# Patient Record
Sex: FEMALE | URBAN - METROPOLITAN AREA
[De-identification: names, ages, dates, MRNs, and addresses within clinical notes are randomized per-mention and may not be internally consistent; named-entity substitution may affect disease eponyms.]

---

## 2019-11-21 ENCOUNTER — OFFICE VISIT (OUTPATIENT)
Dept: PHYSICAL MEDICINE AND REHAB | Age: 69
End: 2019-11-21
Payer: MEDICARE

## 2019-11-21 VITALS — BODY MASS INDEX: 21.97 KG/M2 | WEIGHT: 124 LBS | HEIGHT: 63 IN

## 2019-11-21 DIAGNOSIS — R20.2 NUMBNESS AND TINGLING: ICD-10-CM

## 2019-11-21 DIAGNOSIS — M79.642 LEFT HAND PAIN: Primary | ICD-10-CM

## 2019-11-21 DIAGNOSIS — R20.0 NUMBNESS AND TINGLING: ICD-10-CM

## 2019-11-21 PROCEDURE — 1123F ACP DISCUSS/DSCN MKR DOCD: CPT | Performed by: PHYSICAL MEDICINE & REHABILITATION

## 2019-11-21 PROCEDURE — G8400 PT W/DXA NO RESULTS DOC: HCPCS | Performed by: PHYSICAL MEDICINE & REHABILITATION

## 2019-11-21 PROCEDURE — G8427 DOCREV CUR MEDS BY ELIG CLIN: HCPCS | Performed by: PHYSICAL MEDICINE & REHABILITATION

## 2019-11-21 PROCEDURE — G8484 FLU IMMUNIZE NO ADMIN: HCPCS | Performed by: PHYSICAL MEDICINE & REHABILITATION

## 2019-11-21 PROCEDURE — 95886 MUSC TEST DONE W/N TEST COMP: CPT | Performed by: PHYSICAL MEDICINE & REHABILITATION

## 2019-11-21 PROCEDURE — 4040F PNEUMOC VAC/ADMIN/RCVD: CPT | Performed by: PHYSICAL MEDICINE & REHABILITATION

## 2019-11-21 PROCEDURE — 1090F PRES/ABSN URINE INCON ASSESS: CPT | Performed by: PHYSICAL MEDICINE & REHABILITATION

## 2019-11-21 PROCEDURE — 1036F TOBACCO NON-USER: CPT | Performed by: PHYSICAL MEDICINE & REHABILITATION

## 2019-11-21 PROCEDURE — G8420 CALC BMI NORM PARAMETERS: HCPCS | Performed by: PHYSICAL MEDICINE & REHABILITATION

## 2019-11-21 PROCEDURE — 99202 OFFICE O/P NEW SF 15 MIN: CPT | Performed by: PHYSICAL MEDICINE & REHABILITATION

## 2019-11-21 PROCEDURE — 95913 NRV CNDJ TEST 13/> STUDIES: CPT | Performed by: PHYSICAL MEDICINE & REHABILITATION

## 2019-11-21 PROCEDURE — 3017F COLORECTAL CA SCREEN DOC REV: CPT | Performed by: PHYSICAL MEDICINE & REHABILITATION

## 2019-12-27 DIAGNOSIS — M79.642 LEFT HAND PAIN: ICD-10-CM

## 2022-03-09 LAB
ALBUMIN SERPL-MCNC: 4.5 G/DL (ref 3.5–5.2)
ALP BLD-CCNC: 126 U/L (ref 35–104)
ALT SERPL-CCNC: 13 U/L (ref 0–32)
ANION GAP SERPL CALCULATED.3IONS-SCNC: 15 MMOL/L (ref 7–16)
AST SERPL-CCNC: 20 U/L (ref 0–31)
BASOPHILS ABSOLUTE: 0.03 E9/L (ref 0–0.2)
BASOPHILS RELATIVE PERCENT: 0.4 % (ref 0–2)
BILIRUB SERPL-MCNC: 0.3 MG/DL (ref 0–1.2)
BUN BLDV-MCNC: 15 MG/DL (ref 6–23)
CALCIUM SERPL-MCNC: 9.8 MG/DL (ref 8.6–10.2)
CHLORIDE BLD-SCNC: 104 MMOL/L (ref 98–107)
CHOLESTEROL, TOTAL: 201 MG/DL (ref 0–199)
CO2: 24 MMOL/L (ref 22–29)
CREAT SERPL-MCNC: 0.8 MG/DL (ref 0.5–1)
EOSINOPHILS ABSOLUTE: 0.21 E9/L (ref 0.05–0.5)
EOSINOPHILS RELATIVE PERCENT: 3.1 % (ref 0–6)
GFR AFRICAN AMERICAN: >60
GFR NON-AFRICAN AMERICAN: >60 ML/MIN/1.73
GLUCOSE BLD-MCNC: 111 MG/DL (ref 74–99)
HCT VFR BLD CALC: 37.1 % (ref 34–48)
HDLC SERPL-MCNC: 75 MG/DL
HEMOGLOBIN: 11.3 G/DL (ref 11.5–15.5)
IMMATURE GRANULOCYTES #: 0.01 E9/L
IMMATURE GRANULOCYTES %: 0.1 % (ref 0–5)
LDL CHOLESTEROL CALCULATED: 103 MG/DL (ref 0–99)
LYMPHOCYTES ABSOLUTE: 2.23 E9/L (ref 1.5–4)
LYMPHOCYTES RELATIVE PERCENT: 33.4 % (ref 20–42)
MCH RBC QN AUTO: 27.9 PG (ref 26–35)
MCHC RBC AUTO-ENTMCNC: 30.5 % (ref 32–34.5)
MCV RBC AUTO: 91.6 FL (ref 80–99.9)
MONOCYTES ABSOLUTE: 0.45 E9/L (ref 0.1–0.95)
MONOCYTES RELATIVE PERCENT: 6.7 % (ref 2–12)
NEUTROPHILS ABSOLUTE: 3.75 E9/L (ref 1.8–7.3)
NEUTROPHILS RELATIVE PERCENT: 56.3 % (ref 43–80)
PDW BLD-RTO: 14 FL (ref 11.5–15)
PLATELET # BLD: 232 E9/L (ref 130–450)
PMV BLD AUTO: 10.6 FL (ref 7–12)
POTASSIUM SERPL-SCNC: 4.6 MMOL/L (ref 3.5–5)
RBC # BLD: 4.05 E12/L (ref 3.5–5.5)
SODIUM BLD-SCNC: 143 MMOL/L (ref 132–146)
TOTAL PROTEIN: 7.5 G/DL (ref 6.4–8.3)
TRIGL SERPL-MCNC: 113 MG/DL (ref 0–149)
VITAMIN D 25-HYDROXY: 25 NG/ML (ref 30–100)
VLDLC SERPL CALC-MCNC: 23 MG/DL
WBC # BLD: 6.7 E9/L (ref 4.5–11.5)

## 2024-11-18 ENCOUNTER — APPOINTMENT (OUTPATIENT)
Dept: PRIMARY CARE | Facility: CLINIC | Age: 74
End: 2024-11-18
Payer: MEDICARE

## 2024-11-18 VITALS
HEIGHT: 63 IN | HEART RATE: 68 BPM | DIASTOLIC BLOOD PRESSURE: 74 MMHG | BODY MASS INDEX: 22.77 KG/M2 | OXYGEN SATURATION: 97 % | SYSTOLIC BLOOD PRESSURE: 113 MMHG | TEMPERATURE: 97.1 F | WEIGHT: 128.5 LBS

## 2024-11-18 DIAGNOSIS — G47.62 NOCTURNAL LEG CRAMPS: ICD-10-CM

## 2024-11-18 DIAGNOSIS — Z00.00 ROUTINE GENERAL MEDICAL EXAMINATION AT HEALTH CARE FACILITY: Primary | ICD-10-CM

## 2024-11-18 DIAGNOSIS — I10 PRIMARY HYPERTENSION: ICD-10-CM

## 2024-11-18 DIAGNOSIS — H91.93 BILATERAL CHANGE IN HEARING: ICD-10-CM

## 2024-11-18 DIAGNOSIS — Z29.89 NEED FOR MALARIA PROPHYLAXIS: ICD-10-CM

## 2024-11-18 PROCEDURE — 99214 OFFICE O/P EST MOD 30 MIN: CPT | Performed by: INTERNAL MEDICINE

## 2024-11-18 PROCEDURE — 1170F FXNL STATUS ASSESSED: CPT | Performed by: INTERNAL MEDICINE

## 2024-11-18 PROCEDURE — G0439 PPPS, SUBSEQ VISIT: HCPCS | Performed by: INTERNAL MEDICINE

## 2024-11-18 PROCEDURE — 1036F TOBACCO NON-USER: CPT | Performed by: INTERNAL MEDICINE

## 2024-11-18 PROCEDURE — 3074F SYST BP LT 130 MM HG: CPT | Performed by: INTERNAL MEDICINE

## 2024-11-18 PROCEDURE — 1123F ACP DISCUSS/DSCN MKR DOCD: CPT | Performed by: INTERNAL MEDICINE

## 2024-11-18 PROCEDURE — 1160F RVW MEDS BY RX/DR IN RCRD: CPT | Performed by: INTERNAL MEDICINE

## 2024-11-18 PROCEDURE — G0444 DEPRESSION SCREEN ANNUAL: HCPCS | Performed by: INTERNAL MEDICINE

## 2024-11-18 PROCEDURE — 1159F MED LIST DOCD IN RCRD: CPT | Performed by: INTERNAL MEDICINE

## 2024-11-18 PROCEDURE — G0446 INTENS BEHAVE THER CARDIO DX: HCPCS | Performed by: INTERNAL MEDICINE

## 2024-11-18 PROCEDURE — 3078F DIAST BP <80 MM HG: CPT | Performed by: INTERNAL MEDICINE

## 2024-11-18 PROCEDURE — 3008F BODY MASS INDEX DOCD: CPT | Performed by: INTERNAL MEDICINE

## 2024-11-18 RX ORDER — AMLODIPINE BESYLATE 5 MG/1
5 TABLET ORAL DAILY
Qty: 90 TABLET | Refills: 3 | Status: SHIPPED | OUTPATIENT
Start: 2024-11-18 | End: 2025-11-18

## 2024-11-18 RX ORDER — ATOVAQUONE AND PROGUANIL HYDROCHLORIDE 250; 100 MG/1; MG/1
1 TABLET, FILM COATED ORAL DAILY
Qty: 70 TABLET | Refills: 0 | Status: SHIPPED | OUTPATIENT
Start: 2024-11-18 | End: 2025-01-27

## 2024-11-18 RX ORDER — AMLODIPINE BESYLATE 5 MG/1
5 TABLET ORAL DAILY
COMMUNITY
End: 2024-11-18 | Stop reason: SDUPTHER

## 2024-11-18 ASSESSMENT — ENCOUNTER SYMPTOMS
OCCASIONAL FEELINGS OF UNSTEADINESS: 0
DEPRESSION: 0
LOSS OF SENSATION IN FEET: 0

## 2024-11-18 ASSESSMENT — PATIENT HEALTH QUESTIONNAIRE - PHQ9
1. LITTLE INTEREST OR PLEASURE IN DOING THINGS: NOT AT ALL
SUM OF ALL RESPONSES TO PHQ9 QUESTIONS 1 AND 2: 0
2. FEELING DOWN, DEPRESSED OR HOPELESS: NOT AT ALL

## 2024-11-18 ASSESSMENT — ACTIVITIES OF DAILY LIVING (ADL)
BATHING: INDEPENDENT
GROCERY_SHOPPING: INDEPENDENT
MANAGING_FINANCES: INDEPENDENT
TAKING_MEDICATION: INDEPENDENT
DRESSING: INDEPENDENT
DOING_HOUSEWORK: INDEPENDENT

## 2024-11-18 NOTE — PROGRESS NOTES
"Subjective   Patient ID: Len Ramon \"Keely\" is a 74 y.o. female who presents for Follow-up.    PMH notable for HTN, chronic anemia; generally healthy.    Last AWV: 5/27/2022, seeing [facility] provider since then but they are moving and she needs med refills as she is going to [facility] for 2 months.    Also requesting malaria prophylaxis and readdressing leg cramps (see visit from 11/8/2022).    She was born in [facility], lived in [place] almost her entire adult life.   in May 2019. Lives by herself in a home in [place].  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  Two children, daughter lives here and son lives in [place]. She has four grandchildren (2 next [facility] and 2 in [facility]).  Never smoker, rare social alcohol use.  Exercising, hitting goal of >5K steps per day and keeps busy and active with her grandchildren. Healthy diet.     Objective   Physical Exam    /74 (BP Location: Left arm, Patient Position: Sitting, BP Cuff Size: Adult)   Pulse 68   Temp 36.2 °C (97.1 °F) (Temporal)   Ht 1.6 m (5' 3\")   Wt 58.3 kg (128 lb 8 oz)   SpO2 97%   BMI 22.76 kg/m²      Gen: NAD, pleasant, A&;Ox3  HEENT: PERRL, EOMI, MMM, OP clear  Neck: supple, no thyromegaly, no JVD, normal carotid upstroke  Pulm: lungs CTAB, good air movement  CV: RRR, no m/r/g, 2+ DP pulses  Abd: NABS, soft, NT, ND no HSM  Ext: no peripheral edema  Neuro: CN II-XII intact, no focal sensory or motor deficits, normal reflexes    Recent labs reviewed: CBC, CMP, A1c and lipid panel (6/1/2024)  Assessment/Plan     Traveling to [facility]: Discussed water and mosquito safety, prescribed Malarone for malaria prophylaxis    Hypertension: controlled  -Continue amlodipine 5 mg, lifestyle modifications  -She does ask about possible cardiac score but also would not be willing to take statin therapy based on the results, we will discuss again in the future     Normocytic anemia: Reported chronic and stable for decades, CTM    Nocturnal " leg cramps: Discussed again conservative measures     Health maintenance  -Mild hearing loss, refer for audiology  -Mammogram: declines screening  -Pap: N/A  -DEXA: 11/21/2023, osteopenia with low FRAX  -Last colonoscopy: Never, declining but contemplative, discussed CG as well  -Smoking history: Never  -Counseled regarding diet and exercise  -Immunizations: Got Covid vaccine x2, otherwise has refused all vaccinations  -Followup for AWV and prn    Jesu Grimes MD     5-10 minutes were spent on screening for Depression  The ASCVD Risk score (Barrera GARNETT, et al., 2019) failed to calculate for the following reasons:    Cannot find a previous HDL lab    Cannot find a previous total cholesterol lab  Cardiovascular risk discussed and modifiable risk factors addressed.  Discussion included options of pharmaceutical interventions and recommended lifestyle modifications, including nutritional choices, exercise, and elimination of habits contributing to risk.   >15 minutes spent on assessment and discussion.

## 2024-11-20 ENCOUNTER — CLINICAL SUPPORT (OUTPATIENT)
Dept: AUDIOLOGY | Facility: CLINIC | Age: 74
End: 2024-11-20
Payer: MEDICARE

## 2024-11-20 DIAGNOSIS — H90.3 BILATERAL SENSORINEURAL HEARING LOSS: ICD-10-CM

## 2024-11-20 PROCEDURE — 92557 COMPREHENSIVE HEARING TEST: CPT

## 2024-11-20 PROCEDURE — 92567 TYMPANOMETRY: CPT

## 2024-11-20 ASSESSMENT — PAIN SCALES - GENERAL: PAINLEVEL_OUTOF10: 0 - NO PAIN

## 2024-11-20 ASSESSMENT — PAIN - FUNCTIONAL ASSESSMENT: PAIN_FUNCTIONAL_ASSESSMENT: 0-10

## 2024-11-25 NOTE — PROGRESS NOTES
AUDIOLOGIC EVALUATION      Name:  Keely Ramon  :  1950  Age:  74 y.o.  Date of Evaluation:  2024    Time: 4944-3915    HISTORY:  Len Ramon, 74 y.o., was seen for an audiologic assessment at the request of her primary care physician. She reported having some difficulty hearing in noise. She has a family history of hearing loss including her mother. She denied otalgia, otorrhea, tinnitus, dizziness, aural pressure/fullness, history of noise exposure, history of otologic surgeries, and recent falls.       RESULTS:  Otoscopic Evaluation:  Right Ear: Non-occluding cerumen  Left Ear: Non-occluding cerumen    Immittance Measures:  Right Ear: Type A -- indicating normal volume, pressure, and static compliance  Left Ear: Type A -- indicating normal volume, pressure, and static compliance    Acoustic Reflexes:  Right Ear: (Ipsilateral) Responses present at expected levels for 500-4000 Hz.  Left Ear: (Ipsilateral) Responses present at expected levels for 500-4000 Hz.    Pure Tone Audiometry:    Right Ear: Hearing within normal limits 125-500 Hz sloping to a moderate sensorineural hearing loss 4719-5917 Hz rising to a mild sensorineural hearing loss 9001-7994 Hz    Left Ear: Hearing within normal limits 125-500 Hz sloping to a mild sensorineural hearing loss 3294-5383 Hz    Asymmetry: No    Reliability:   Good    Speech Audiometry:   Right: Speech Reception Threshold (SRT) was obtained at 40 dBHL.   SRT/PTA in Good agreement with pure tone average.    Left: Speech Reception Threshold (SRT) was obtained at 35 dBHL.   SRT/PTA in Good agreement with pure tone average.    Word Recognition Scores (WRS):  Right Ear: good (80%) in quiet when words were presented at 80 dBHL w/ masking.   Left Ear: excellent (92%) in quiet when words were presented at 80 dBHL w/ masking.     Asymmetry: No      IMPRESSIONS:  Today's testing revealed normal ear canal volume, middle ear pressure, and tympanic membrane compliance  in both ears. She has hearing within normal limits 125-500 Hz sloping to a moderate sensorineural hearing loss 8281-4372 Hz rising to a mild sensorineural hearing loss 5718-0244 Hz in the right ear. In the left ear she has hearing within normal limits 125-500 Hz sloping to a mild sensorineural hearing loss 4013-3063 Hz. She has good word recognition in the right ear (80%) and excellent word recognition in the left ear (92%). The results were discussed with the patient. She should follow-up for annual audiologic assessments.       RECOMMENDATIONS:  1.) Return for audiologic evaluation annually to monitor hearing sensitivity and assess middle ear status or sooner should concerns arise. The audiology department can be reached at [phone] to schedule an appointment.   2.) Consider amplification pending medical clearance. Check insurance for hearing aid benefits and in-network providers. To schedule a hearing aid consultation with [facility] audiology department, call [phone].       Nirmala Lamb, CCC-A  Clinical Audiologist        KEY  SNHL Sensorineural Hearing Loss   CHL Conductive Hearing Loss   MHL Mixed Hearing Loss   SSNHL Sudden Sensorineural Hearing Loss   WNL Within Normal Limits   PTA Pure Tone Average   TM Tympanic Membrane   ECV Ear Canal Volume   SRT Speech Reception Threshold   WRS Word Recognition Score

## 2025-04-06 ENCOUNTER — OFFICE VISIT (OUTPATIENT)
Dept: URGENT CARE | Age: 75
End: 2025-04-06
Payer: MEDICARE

## 2025-04-06 VITALS
TEMPERATURE: 99.3 F | BODY MASS INDEX: 22.67 KG/M2 | OXYGEN SATURATION: 95 % | RESPIRATION RATE: 18 BRPM | SYSTOLIC BLOOD PRESSURE: 126 MMHG | DIASTOLIC BLOOD PRESSURE: 77 MMHG | WEIGHT: 128 LBS | HEART RATE: 70 BPM

## 2025-04-06 DIAGNOSIS — R50.9 FEVER, UNSPECIFIED FEVER CAUSE: ICD-10-CM

## 2025-04-06 DIAGNOSIS — R07.89 CHEST DISCOMFORT: ICD-10-CM

## 2025-04-06 DIAGNOSIS — B02.9 HERPES ZOSTER WITHOUT COMPLICATION: ICD-10-CM

## 2025-04-06 DIAGNOSIS — R21 RASH: Primary | ICD-10-CM

## 2025-04-06 RX ORDER — VALACYCLOVIR HYDROCHLORIDE 1 G/1
1000 TABLET, FILM COATED ORAL 3 TIMES DAILY
Qty: 21 TABLET | Refills: 0 | Status: SHIPPED | OUTPATIENT
Start: 2025-04-06 | End: 2025-04-13

## 2025-04-06 NOTE — PROGRESS NOTES
"Subjective   Patient ID: Len Ramon \"Keely\" is a 74 y.o. female. They present today with a chief complaint of Rash (Day 4- Right Side/ Back ).    History of Present Illness    Rash        Past Medical History  Allergies as of 2025    (No Known Allergies)       (Not in a hospital admission)       Past Medical History:   Diagnosis Date    Bacteriuria     Bacteriuria with pyuria       Past Surgical History:   Procedure Laterality Date    OTHER SURGICAL HISTORY  2021     section        reports that she has never smoked. She has never used smokeless tobacco. She reports that she does not drink alcohol and does not use drugs.    Review of Systems  Review of Systems   Skin:  Positive for rash.   All other systems reviewed and are negative.                                 Objective    Vitals:    25 1551   BP: 126/77   Pulse: 70   Resp: 18   Temp: 37.4 °C (99.3 °F)   SpO2: 95%   Weight: 58.1 kg (128 lb)     No LMP recorded (lmp unknown).    Physical Exam  Vitals and nursing note reviewed.   Constitutional:       Appearance: Normal appearance.   HENT:      Head: Normocephalic.      Nose: Nose normal.      Mouth/Throat:      Mouth: Mucous membranes are dry.      Pharynx: Oropharynx is clear.   Eyes:      Extraocular Movements: Extraocular movements intact.      Pupils: Pupils are equal, round, and reactive to light.   Cardiovascular:      Rate and Rhythm: Normal rate and regular rhythm.      Pulses: Normal pulses.      Heart sounds: Normal heart sounds.   Pulmonary:      Effort: Pulmonary effort is normal.      Breath sounds: Normal breath sounds and air entry. No decreased breath sounds, wheezing or rhonchi.   Musculoskeletal:         General: Normal range of motion.      Cervical back: Normal range of motion and neck supple.   Lymphadenopathy:      Cervical: No cervical adenopathy.   Skin:     General: Skin is warm and dry.      Findings: Rash present. Rash is pustular and vesicular.        "      Comments: Intact, erythematous vesicular-pustular rash along T4-T6 A/P   Neurological:      General: No focal deficit present.      Mental Status: She is alert and oriented to person, place, and time.   Psychiatric:         Mood and Affect: Mood normal.         Behavior: Behavior normal.         Procedures    Point of Care Test & Imaging Results from this visit  No results found for this visit on 04/06/25.   Imaging  No results found.    Cardiology, Vascular, and Other Imaging  No other imaging results found for the past 2 days      Diagnostic study results (if any) were reviewed by [facility].    Assessment/Plan   Allergies, medications, history, and pertinent labs/EKGs/Imaging reviewed by Helga Granados, APRN-CNP.     Medical Decision Making  75 y/o F c/o rash x 2-3 days to abdomen with slight burning prior to eruption of rash. Pt lives alone with no recent stressors, but noticed an eruption of a rash and wanted evaluation    Pt managing secretions, airway intact. There or no signs of PTA/TA, trismus, retropharyngeal abscess or epiglotitis. No signs of osteomyelitis, orbital cellulitis or other complications of sinusitis at this point in time. there is no signs of respiratory distress. SpO2 >94% on RA. There is no reported SOB, CP, CABRERA, pleuritic pain, fever.  Pt is ambulating without difficulty showing no signs of hypoxia. Pt well-hydrated, nontoxic and there no signs of clinical deterioration. Patient well hydrated, neurovascularly intact.      PE abnormalities are consistent with shingles, so valtrex prescribed  Education provided on whom to avoid and rash could take 2-4 weeks to fully resolve  F/u PCP 1 week  DO NOT scratch, secondary infection risk discussed  Pt states she gets intermittent chest discomfort, BUT NOT CP so EKG conducted- NSR  calamine lotion and/or aloe vera to apply to area if itching  If s/s worsen, go to ER    Follow up Care: Pt instructed to follow-up with PCP or  other appropriate clinician within 24 to 48 hours. Report to ED if there is any development in worsening pain, difficulty swallowing, change in phonation, fever, chills, neck pain, photophobia, headache, neck stiffness, chest pain, abdominal pain, vomiting, syncope, hemoptysis, leg swelling SOB, fever, facial swelling, eye pain, periorbital swelling/erythema, or any new signs or sx.     The patient was educated regarding diagnosis, supportive care, OTC and Rx medications. The patient was given the opportunity to ask questions prior to discharge. They verbalized understanding of my discussion of the plans for treatment, expected course, indications to return to  or seek further evaluation in ED, and the need for timely follow up as directed.         take your medicines exactly as prescribed. Call your doctor or nurse advice line if you think you are having a problem with your medicine. Antiviral medicine helps you get better faster and may help prevent later problems.  Try not to scratch or pick at the blisters.  Keep the blisters moist until they heal over. One way to do this is to cover them with a thin layer of petroleum jelly, such as Vaseline, and a non-stick bandage.  Take an over-the-counter pain medicine, such as acetaminophen (Tylenol), ibuprofen (Advil, Motrin), or naproxen (Aleve). Read and follow all instructions on the label.  Avoid close contact with people until the blisters have healed. It is very important for you to avoid contact with anyone who has never had chickenpox or the chickenpox vaccine. Young babies and anyone who is pregnant or has a hard time fighting infection (such as someone with HIV, diabetes, or cancer) are especially at risk.    When should you call for help?     Call your doctor or seek immediate medical care if:    You have a new or higher fever.  You have a severe headache and a stiff neck.  You lose the ability to think clearly.  The rash spreads to your forehead, nose, eyes,  or eyelids.  You have eye pain, or your vision gets worse.  You have new pain in your face, or you can't move the muscles in your face.  Blisters spread to new parts of your body.  You have symptoms of infection, such as increased pain, swelling, warmth, or redness.    Orders and Diagnoses  Diagnoses and all orders for this visit:  Rash  Herpes zoster without complication  -     valACYclovir (Valtrex) 1 gram tablet; Take 1 tablet (1,000 mg) by mouth 3 times a day for 7 days.  Fever, unspecified fever cause  Chest discomfort  -     ECG 12 lead (Clinic Performed)      Medical Admin Record      Patient disposition: Home    Electronically signed by [facility]  4:31 PM

## 2025-04-09 ENCOUNTER — APPOINTMENT (OUTPATIENT)
Dept: PRIMARY CARE | Facility: CLINIC | Age: 75
End: 2025-04-09
Payer: MEDICARE

## 2025-04-09 VITALS
HEIGHT: 63 IN | HEART RATE: 76 BPM | DIASTOLIC BLOOD PRESSURE: 75 MMHG | WEIGHT: 128 LBS | TEMPERATURE: 98.4 F | SYSTOLIC BLOOD PRESSURE: 118 MMHG | BODY MASS INDEX: 22.68 KG/M2

## 2025-04-09 DIAGNOSIS — I10 PRIMARY HYPERTENSION: ICD-10-CM

## 2025-04-09 DIAGNOSIS — L08.9 SKIN INFECTION: Primary | ICD-10-CM

## 2025-04-09 PROCEDURE — 3008F BODY MASS INDEX DOCD: CPT | Performed by: INTERNAL MEDICINE

## 2025-04-09 PROCEDURE — 3074F SYST BP LT 130 MM HG: CPT | Performed by: INTERNAL MEDICINE

## 2025-04-09 PROCEDURE — G2211 COMPLEX E/M VISIT ADD ON: HCPCS | Performed by: INTERNAL MEDICINE

## 2025-04-09 PROCEDURE — 99213 OFFICE O/P EST LOW 20 MIN: CPT | Performed by: INTERNAL MEDICINE

## 2025-04-09 PROCEDURE — 3078F DIAST BP <80 MM HG: CPT | Performed by: INTERNAL MEDICINE

## 2025-04-09 PROCEDURE — 1123F ACP DISCUSS/DSCN MKR DOCD: CPT | Performed by: INTERNAL MEDICINE

## 2025-04-09 RX ORDER — MUPIROCIN 20 MG/G
OINTMENT TOPICAL 2 TIMES DAILY
Qty: 22 G | Refills: 0 | Status: SHIPPED | OUTPATIENT
Start: 2025-04-09 | End: 2025-04-19

## 2025-04-09 RX ORDER — AMLODIPINE BESYLATE 5 MG/1
5 TABLET ORAL DAILY
Qty: 90 TABLET | Refills: 3 | Status: SHIPPED | OUTPATIENT
Start: 2025-04-09 | End: 2026-04-09

## 2025-04-09 NOTE — PROGRESS NOTES
"Subjective   Patient ID: Len Ramon \"Keely\" is a 74 y.o. female who presents for sick visit.     HPI  States she noticed a rash over R side of chest last Thursday. Denies pain prior rash starting. Rash first started off as redness then started forming vesicles. Has never had this before. Went to urgent care and started taking Valtrex on Sunday. Still having a lot of pain but has not taken pain medication. Lesions have still not crusted over. Otherwise denies fevers but endorsing occasional chills. Also endorses a scalp lesion, denies any head trauma.       Objective   Vitals:    04/09/25 1045   BP: 118/75   Pulse: 76   Temp: 36.9 °C (98.4 °F)      Physical Exam  General: NAD  HEENT: NCAT, small scalp lesion (does not appear like shingles vesicle)  Skin: Erythema and fluid filled vesicles along the right T5 distribution from underneath R breast to R scapular area     Assessment/Plan     74F presenting with shingles. No signs of secondary bacterial infection. Continue Valtrex 7d course. Informed to reach out if vesicles do not crust over and can extend course of Valtrex. Use tylenol or ibuprofen prn for pain. Bactroban ointment rx for scalp lesion. Can use bactroban on shingles lesions as well if they start to appear infected. Advised to keep lesions covered and avoid contact with immunocompromised people.     Keshia Saravia MD 04/09/25 10:39 AM   Internal Medicine, PGY3  "

## 2025-04-14 DIAGNOSIS — L08.9 SKIN INFECTION: ICD-10-CM

## 2025-04-15 RX ORDER — MUPIROCIN 20 MG/G
OINTMENT TOPICAL 2 TIMES DAILY
Qty: 22 G | Refills: 0 | OUTPATIENT
Start: 2025-04-15 | End: 2025-04-25

## 2025-04-16 ENCOUNTER — APPOINTMENT (OUTPATIENT)
Dept: PRIMARY CARE | Facility: CLINIC | Age: 75
End: 2025-04-16
Payer: MEDICARE

## 2025-04-17 ENCOUNTER — OFFICE VISIT (OUTPATIENT)
Dept: URGENT CARE | Age: 75
End: 2025-04-17
Payer: MEDICARE

## 2025-04-17 VITALS
OXYGEN SATURATION: 96 % | TEMPERATURE: 97.2 F | HEART RATE: 74 BPM | RESPIRATION RATE: 14 BRPM | SYSTOLIC BLOOD PRESSURE: 139 MMHG | DIASTOLIC BLOOD PRESSURE: 84 MMHG

## 2025-04-17 DIAGNOSIS — B02.29 POST HERPETIC NEURALGIA: Primary | ICD-10-CM

## 2025-04-17 NOTE — PROGRESS NOTES
"Subjective   Patient ID: Len Ramon \"Keely\" is a 74 y.o. female. They present today with a chief complaint of Rash (Chest pain from shingles).    History of Present Illness    Rash      This is a 74-year-old female who was diagnosed with herpes zoster 2 weeks ago presents today complaining of right sided chest pain localized to the breast region onset 3 days ago.  Patient states her symptoms are worse with laying down.  She denies any fever or chills she denies any cough.  She denies any shortness of breath.  Patient states her shingles was severe over the right breast region.  She denies any radiation of her pain.  Past Medical History  Allergies as of 04/17/2025    (No Known Allergies)       Prescriptions Prior to Admission[1]     Medical History[2]    Surgical History[3]     reports that she has never smoked. She has never used smokeless tobacco. She reports that she does not drink alcohol and does not use drugs.    Review of Systems  Review of Systems   Skin:  Positive for rash.   All other systems reviewed and are negative.                                 Objective    Vitals:    04/17/25 0843   BP: 139/84   Pulse: 74   Resp: 14   Temp: 36.2 °C (97.2 °F)   TempSrc: Oral   SpO2: 96%     No LMP recorded (lmp unknown).    Physical Exam  Patient is awake alert oriented x 3 in no acute distress vital signs are stable.  She is afebrile.  Examination of the chest wall reveals healing crusted lesion noted over the entire breast and extending to the mid axillary line into the posterior chest wall.  The lesions are all crusted and dry.  There was no erythema noted.  Area is not tender to palpation.  Lungs are clear throughout.  Procedures    Point of Care Test & Imaging Results from this visit  No results found for this visit on 04/17/25.   Imaging  No results found.    Cardiology, Vascular, and Other Imaging  No other imaging results found for the past 2 days      Diagnostic study results (if any) were reviewed " by José Manuel Harris DO.    Assessment/Plan   Allergies, medications, history, and pertinent labs/EKGs/Imaging reviewed by José Manuel Harris DO.     Medical Decision Making  The patient was advised to take Advil 4 p.o. every 8 hours for the next 2 to 3 days.  If there is no improvement the patient is to be reevaluated by her family physician or the emergency room.    Orders and Diagnoses  Diagnoses and all orders for this visit:  Post herpetic neuralgia      Medical Admin Record      Patient disposition: Home    Electronically signed by José Manuel Harris DO  9:18 AM           [1] (Not in a hospital admission)  [2]   Past Medical History:  Diagnosis Date    Bacteriuria     Bacteriuria with pyuria   [3]   Past Surgical History:  Procedure Laterality Date    OTHER SURGICAL HISTORY  2021     section

## 2025-04-29 ENCOUNTER — OFFICE VISIT (OUTPATIENT)
Dept: PRIMARY CARE | Facility: CLINIC | Age: 75
End: 2025-04-29
Payer: MEDICARE

## 2025-04-29 VITALS
OXYGEN SATURATION: 95 % | DIASTOLIC BLOOD PRESSURE: 77 MMHG | WEIGHT: 129.6 LBS | SYSTOLIC BLOOD PRESSURE: 118 MMHG | HEIGHT: 63 IN | TEMPERATURE: 98.1 F | BODY MASS INDEX: 22.96 KG/M2 | HEART RATE: 67 BPM

## 2025-04-29 DIAGNOSIS — B02.8 HERPES ZOSTER WITH OTHER COMPLICATION: Primary | ICD-10-CM

## 2025-04-29 PROCEDURE — 99214 OFFICE O/P EST MOD 30 MIN: CPT | Performed by: INTERNAL MEDICINE

## 2025-04-29 PROCEDURE — 1158F ADVNC CARE PLAN TLK DOCD: CPT | Performed by: INTERNAL MEDICINE

## 2025-04-29 PROCEDURE — 3008F BODY MASS INDEX DOCD: CPT | Performed by: INTERNAL MEDICINE

## 2025-04-29 PROCEDURE — 1125F AMNT PAIN NOTED PAIN PRSNT: CPT | Performed by: INTERNAL MEDICINE

## 2025-04-29 PROCEDURE — 1123F ACP DISCUSS/DSCN MKR DOCD: CPT | Performed by: INTERNAL MEDICINE

## 2025-04-29 PROCEDURE — 1159F MED LIST DOCD IN RCRD: CPT | Performed by: INTERNAL MEDICINE

## 2025-04-29 PROCEDURE — G2211 COMPLEX E/M VISIT ADD ON: HCPCS | Performed by: INTERNAL MEDICINE

## 2025-04-29 RX ORDER — PREDNISONE 5 MG/1
TABLET ORAL
Qty: 54 TABLET | Refills: 0 | Status: SHIPPED | OUTPATIENT
Start: 2025-04-29 | End: 2025-05-11

## 2025-04-29 RX ORDER — OXYCODONE HYDROCHLORIDE 5 MG/1
2.5-5 TABLET ORAL EVERY 4 HOURS PRN
Qty: 15 TABLET | Refills: 0 | Status: SHIPPED | OUTPATIENT
Start: 2025-04-29 | End: 2025-05-06

## 2025-04-29 ASSESSMENT — PATIENT HEALTH QUESTIONNAIRE - PHQ9
2. FEELING DOWN, DEPRESSED OR HOPELESS: NOT AT ALL
1. LITTLE INTEREST OR PLEASURE IN DOING THINGS: NOT AT ALL
SUM OF ALL RESPONSES TO PHQ9 QUESTIONS 1 AND 2: 0

## 2025-04-29 ASSESSMENT — ENCOUNTER SYMPTOMS
DEPRESSION: 0
LOSS OF SENSATION IN FEET: 0
OCCASIONAL FEELINGS OF UNSTEADINESS: 0

## 2025-04-29 ASSESSMENT — PAIN SCALES - GENERAL: PAINLEVEL_OUTOF10: 10-WORST PAIN EVER

## 2025-04-29 NOTE — PROGRESS NOTES
"Subjective   Patient ID: Len Ramon \"Keely\" is a 74 y.o. female who presents for Chest Pain.    Ongoing pain from neuritis due to herpes zoster, initially diagnosed 4/6/2025.  She completed 1 week course of Valtrex and was still having active lesions so another 3 days was added to complete 10 days after her visit with us 4/9/2025.  Went to urgent care on 4/17/2025 for ongoing pain healing crusted lesions were noted without erythema advised to take Advil.  Contacted me on 4/22/2025 with ongoing pain and was given instructions on highest dose of ibuprofen and acetaminophen and offered oxycodone for breakthrough pain.  At the time she preferred to just try to continue with ibuprofen and acetaminophen.  Contacted me again on 4/29/2025 with persistent pain and again asking how much ibuprofen she could take and instructions were provided and told that perhaps she should return for follow-up to reassess if pain is not controlled.    She has also tried heat and cold, lidocaine topical.  Hurts all the time, can't sleep at night.  Has not improved.    Radiating pain in right thoracic dermatome from shingles.    Today took 400mg of Advil, minimal relief.  She has cut down from 12 (x200mg) to 8 per day.  Has not taken Tylenol, upsets her stomach.    Family and friends are telling her she is taking too much Advil and should not take oxycodone because she'll get \"hooked\".      Objective   Physical Exam    /77   Pulse 67   Temp 36.7 °C (98.1 °F)   Ht 1.6 m (5' 3\")   Wt 58.8 kg (129 lb 9.6 oz)   LMP  (LMP Unknown)   SpO2 95%   BMI 22.96 kg/m²      NAD, pleasant  Well-healed rash with residual hyperpigmentation, minimal tenderness, no swelling or erythema    Assessment/Plan     Herpes zoster with acute neuritis: Currently about 3 weeks since onset of symptoms; no other complications:  - trial of prednisone taper  - start weaning/limiting NSAIDs, monitor for s/s of GI bleeding/irritation  - oxycodone for " breakthrough  - if no improvement, consider adding gabapentin    Jesu Grimes MD

## 2025-05-05 ENCOUNTER — APPOINTMENT (OUTPATIENT)
Dept: RADIOLOGY | Facility: HOSPITAL | Age: 75
End: 2025-05-05
Payer: MEDICARE

## 2025-05-05 ENCOUNTER — TELEMEDICINE (OUTPATIENT)
Dept: PRIMARY CARE | Facility: CLINIC | Age: 75
End: 2025-05-05
Payer: MEDICARE

## 2025-05-05 ENCOUNTER — TELEPHONE (OUTPATIENT)
Dept: PRIMARY CARE | Facility: CLINIC | Age: 75
End: 2025-05-05

## 2025-05-05 DIAGNOSIS — Z12.31 SCREENING MAMMOGRAM FOR BREAST CANCER: ICD-10-CM

## 2025-05-05 DIAGNOSIS — B02.29 HZV (HERPES ZOSTER VIRUS) POST HERPETIC NEURALGIA: Primary | ICD-10-CM

## 2025-05-05 PROCEDURE — 1123F ACP DISCUSS/DSCN MKR DOCD: CPT | Performed by: PHYSICIAN ASSISTANT

## 2025-05-05 NOTE — PROGRESS NOTES
"Subjective   Patient ID:   Lne Ramon \"Horace" is a 74 y.o. female who presents for post shingles discomfort    Virtual or Telephone Consent    An interactive audio and video telecommunication system which permits real time communications between the patient (at the originating site) and provider (at the distant site) was utilized to provide this telehealth service.   Verbal consent was requested and obtained from Len Ramon on this date, 05/05/25 for a telehealth visit and the patient's location was confirmed at the time of the visit.    HPI  Here with acute symptoms: Post shingles nerve pain on the right back x 1 month overlying where her shingles were.  Recently seen by her primary care provider who put her on prednisone, she did previously complete Valtrex.  She reports pain is severe 10 out of 10.  She reports she did previously take ibuprofen which did not help, prior to the prednisone.  The pain is not new, no acute worsening since seeing her PCP.  She denies any chest pain, shortness of breath, dyspnea exertion or palpitations.  She denies any AMS, one-sided weakness, facial, slurring her words.  Discussed with patient that options are limited from an urgent care standpoint.  Recommend that she follow-up with her primary care provider.  ER if symptoms drastically worsen.  Patient verbalized understanding, as we are unable to help her today      ROS is negative unless otherwise stated above      Physical Exam  General: Alert and oriented, well nourished, no acute distress.  Lungs: Unlabored on room air, speaking in full phrases without having to stop to breathe  Neurologic: Awake, alert, and oriented X3,   Psychiatric: Cooperative, appropriate mood and affect.    Assessment/Plan   Diagnoses and all orders for this visit:  HZV (herpes zoster virus) post herpetic neuralgia   - Options limited from virtual care standpoint, patient referred to PCP.  Patient instructed to go to emergency room for " any severely worsening symptoms.  Patient verbalized understanding with all questions answered.

## 2025-08-06 DIAGNOSIS — Z12.31 ENCOUNTER FOR SCREENING MAMMOGRAM FOR BREAST CANCER: ICD-10-CM

## 2025-08-07 ENCOUNTER — TELEPHONE (OUTPATIENT)
Dept: PRIMARY CARE | Facility: CLINIC | Age: 75
End: 2025-08-07
Payer: MEDICARE

## 2025-08-07 DIAGNOSIS — Z00.00 ROUTINE GENERAL MEDICAL EXAMINATION AT HEALTH CARE FACILITY: ICD-10-CM

## 2025-08-07 DIAGNOSIS — I10 PRIMARY HYPERTENSION: Primary | ICD-10-CM

## 2025-08-07 DIAGNOSIS — R73.03 PREDIABETES: ICD-10-CM

## 2025-08-14 ENCOUNTER — APPOINTMENT (OUTPATIENT)
Dept: RADIOLOGY | Facility: CLINIC | Age: 75
End: 2025-08-14
Payer: MEDICARE

## 2025-08-14 VITALS — BODY MASS INDEX: 22.97 KG/M2 | WEIGHT: 129.63 LBS | HEIGHT: 63 IN

## 2025-08-14 DIAGNOSIS — Z12.31 ENCOUNTER FOR SCREENING MAMMOGRAM FOR BREAST CANCER: ICD-10-CM

## 2025-08-14 PROCEDURE — 77067 SCR MAMMO BI INCL CAD: CPT

## 2025-11-06 ENCOUNTER — APPOINTMENT (OUTPATIENT)
Dept: PRIMARY CARE | Facility: CLINIC | Age: 75
End: 2025-11-06
Payer: MEDICARE